# Patient Record
Sex: FEMALE | Race: OTHER | HISPANIC OR LATINO
[De-identification: names, ages, dates, MRNs, and addresses within clinical notes are randomized per-mention and may not be internally consistent; named-entity substitution may affect disease eponyms.]

---

## 2019-03-26 ENCOUNTER — RESULT REVIEW (OUTPATIENT)
Age: 49
End: 2019-03-26

## 2022-01-11 PROBLEM — Z00.00 ENCOUNTER FOR PREVENTIVE HEALTH EXAMINATION: Status: ACTIVE | Noted: 2022-01-11

## 2022-01-27 ENCOUNTER — NON-APPOINTMENT (OUTPATIENT)
Age: 52
End: 2022-01-27

## 2022-01-27 ENCOUNTER — APPOINTMENT (OUTPATIENT)
Dept: OBGYN | Facility: CLINIC | Age: 52
End: 2022-01-27
Payer: COMMERCIAL

## 2022-01-27 VITALS
BODY MASS INDEX: 25.21 KG/M2 | SYSTOLIC BLOOD PRESSURE: 117 MMHG | WEIGHT: 137 LBS | HEIGHT: 62 IN | DIASTOLIC BLOOD PRESSURE: 75 MMHG

## 2022-01-27 DIAGNOSIS — Z87.442 PERSONAL HISTORY OF URINARY CALCULI: ICD-10-CM

## 2022-01-27 DIAGNOSIS — Z87.39 PERSONAL HISTORY OF OTHER DISEASES OF THE MUSCULOSKELETAL SYSTEM AND CONNECTIVE TISSUE: ICD-10-CM

## 2022-01-27 DIAGNOSIS — Z01.419 ENCOUNTER FOR GYNECOLOGICAL EXAMINATION (GENERAL) (ROUTINE) W/OUT ABNORMAL FINDINGS: ICD-10-CM

## 2022-01-27 DIAGNOSIS — Z78.9 OTHER SPECIFIED HEALTH STATUS: ICD-10-CM

## 2022-01-27 DIAGNOSIS — Z86.39 PERSONAL HISTORY OF OTHER ENDOCRINE, NUTRITIONAL AND METABOLIC DISEASE: ICD-10-CM

## 2022-01-27 DIAGNOSIS — Z83.3 FAMILY HISTORY OF DIABETES MELLITUS: ICD-10-CM

## 2022-01-27 DIAGNOSIS — Z82.49 FAMILY HISTORY OF ISCHEMIC HEART DISEASE AND OTHER DISEASES OF THE CIRCULATORY SYSTEM: ICD-10-CM

## 2022-01-27 PROCEDURE — 99386 PREV VISIT NEW AGE 40-64: CPT

## 2022-01-27 RX ORDER — DAPAGLIFLOZIN 10 MG/1
10 TABLET, FILM COATED ORAL
Refills: 0 | Status: ACTIVE | COMMUNITY

## 2022-01-27 NOTE — PLAN
[FreeTextEntry1] : 50 yo  with recently diagnosed DM who presents today for [employer]. \par \par - MMG and breast sonogram up to date per patient\par - Normal clinical breast and pelvic exam\par - Pap collected\par - Affirm collected; vaginal and vulvar hygiene - avoid irritants (especially vinegar), no underwear at nighttime, and can use aquaphor, vaseline etc; daily probiotic recommended\par - Discussed and counseled on perimenopausal transition\par \par RTO PRN or with [employer].\par \par Mary Anne Friedman MD

## 2022-01-27 NOTE — HISTORY OF PRESENT ILLNESS
[Patient reported mammogram was normal] : Patient reported mammogram was normal [Patient reported breast sonogram was normal] : Patient reported breast sonogram was normal [Patient reported bone density results were normal] : Patient reported bone density results were normal [Patient reported colonoscopy was normal] : Patient reported colonoscopy was normal [perimenopausal] : perimenopausal [Menses] : menses [Urination] : urination [Irregular Menstrual Interval] : irregular menstrual interval [Light Bleeding] : light bleeding [Pain] : pain [Hot Flashes] : hot flashes [Night Sweats] : night sweats [Experiencing Menopausal Sxs] : experiencing menopausal symptoms [Currently Active] : currently active [Men] : men [Vaginal] : vaginal [No] : No [TextBox_4] : 52 yo  with recently diagnosed DM who presents today for [employer]. \par \par Pt here for [employer]. She notes periods have become irrregular but lighter. Notes vaginal itchiness and increased discharge. Has been using water and vinegar on her labia to clear per her sister's recommendations. Also reports hot flashes and mood swings. Does not want to be on any medications, wants to try things naturally. Sexually active with her .\par \par Pt recently diagnosed with DM and has changed her diet significantly. Lost 20 lb in 3 months. Has been drinking 1 gallon of H2O daily and became vegan. She has f/u next month to do scan of her bladder and to do a 24 hr urine d/t increased proteinuria. Chronic back pain.  [PGHxTotal] : 3 [[facility] : 3 [[facility]] : 3 [FreeTextEntry1] : CD x 3

## 2022-01-27 NOTE — REASON FOR VISIT
[Initial] : an initial consultation for [FreeTextEntry2] : annual, vaginal itch back aches and lower abd. pain.

## 2022-01-27 NOTE — PHYSICAL EXAM
[Chaperone Present] : A chaperone was present in the examining room during all aspects of the physical examination [Appropriately responsive] : appropriately responsive [Alert] : alert [No Acute Distress] : no acute distress [Regular Rate Rhythm] : regular rate rhythm [No Murmurs] : no murmurs [Clear to Auscultation B/L] : clear to auscultation bilaterally [Non-tender] : non-tender [Non-distended] : non-distended [No Mass] : no mass [Oriented x3] : oriented x3 [Examination Of The Breasts] : a normal appearance [No Masses] : no breast masses were palpable [Normal] : normal [Uterine Adnexae] : normal [FreeTextEntry1] : small folliculitis on R labia

## 2022-01-31 LAB
CANDIDA VAG CYTO: NOT DETECTED
G VAGINALIS+PREV SP MTYP VAG QL MICRO: NOT DETECTED
HPV HIGH+LOW RISK DNA PNL CVX: NOT DETECTED
T VAGINALIS VAG QL WET PREP: NOT DETECTED

## 2022-02-03 LAB — CYTOLOGY CVX/VAG DOC THIN PREP: NORMAL
